# Patient Record
Sex: MALE | Race: WHITE | Employment: FULL TIME | URBAN - NONMETROPOLITAN AREA
[De-identification: names, ages, dates, MRNs, and addresses within clinical notes are randomized per-mention and may not be internally consistent; named-entity substitution may affect disease eponyms.]

---

## 2017-05-19 LAB
BUN BLDV-MCNC: 17 MG/DL
CALCIUM SERPL-MCNC: 9.4 MG/DL
CHLORIDE BLD-SCNC: 102 MMOL/L
CO2: 28 MMOL/L
CREAT SERPL-MCNC: 0.91 MG/DL
GFR CALCULATED: >60
GLUCOSE BLD-MCNC: 89 MG/DL
POTASSIUM SERPL-SCNC: 4.8 MMOL/L
SODIUM BLD-SCNC: 138 MMOL/L

## 2017-05-31 ENCOUNTER — OFFICE VISIT (OUTPATIENT)
Dept: NEPHROLOGY | Age: 32
End: 2017-05-31

## 2017-05-31 VITALS
DIASTOLIC BLOOD PRESSURE: 82 MMHG | WEIGHT: 240.1 LBS | HEART RATE: 74 BPM | OXYGEN SATURATION: 96 % | SYSTOLIC BLOOD PRESSURE: 121 MMHG

## 2017-05-31 PROCEDURE — 99203 OFFICE O/P NEW LOW 30 MIN: CPT | Performed by: INTERNAL MEDICINE

## 2017-05-31 ASSESSMENT — ENCOUNTER SYMPTOMS
SHORTNESS OF BREATH: 0
BLOOD IN STOOL: 0
CHEST TIGHTNESS: 0
WHEEZING: 0
DIARRHEA: 0
ABDOMINAL DISTENTION: 0
RESPIRATORY NEGATIVE: 1
NAUSEA: 0
CONSTIPATION: 0
GASTROINTESTINAL NEGATIVE: 1
BACK PAIN: 0
COUGH: 0
VOMITING: 0
ABDOMINAL PAIN: 0
FACIAL SWELLING: 0

## 2018-03-25 LAB
BUN BLDV-MCNC: 11 MG/DL
CALCIUM SERPL-MCNC: 8.8 MG/DL
CHLORIDE BLD-SCNC: 102 MMOL/L
CO2: 28 MMOL/L
CREAT SERPL-MCNC: 0.83 MG/DL
GFR CALCULATED: 60
GLUCOSE BLD-MCNC: 92 MG/DL
POTASSIUM SERPL-SCNC: 4.4 MMOL/L
SODIUM BLD-SCNC: 138 MMOL/L

## 2018-03-28 ENCOUNTER — OFFICE VISIT (OUTPATIENT)
Dept: NEPHROLOGY | Age: 33
End: 2018-03-28

## 2018-03-28 ENCOUNTER — TELEPHONE (OUTPATIENT)
Dept: NEPHROLOGY | Age: 33
End: 2018-03-28

## 2018-03-28 VITALS
WEIGHT: 232.4 LBS | OXYGEN SATURATION: 96 % | SYSTOLIC BLOOD PRESSURE: 117 MMHG | HEART RATE: 77 BPM | DIASTOLIC BLOOD PRESSURE: 70 MMHG

## 2018-03-28 DIAGNOSIS — I82.90 VENOUS THROMBOSIS OF LEG: Primary | ICD-10-CM

## 2018-03-28 PROCEDURE — 99213 OFFICE O/P EST LOW 20 MIN: CPT | Performed by: INTERNAL MEDICINE

## 2018-03-28 ASSESSMENT — ENCOUNTER SYMPTOMS
RESPIRATORY NEGATIVE: 1
GASTROINTESTINAL NEGATIVE: 1

## 2018-03-28 NOTE — PROGRESS NOTES
Visit Date: 3/28/2018      HPI:     Gerda Bray is a 28 y.o. male who presents today for:  Chief Complaint   Patient presents with    Chronic Kidney Disease     Stage II       Current Outpatient Prescriptions   Medication Sig Dispense Refill    Glucosamine-Chondroit-Vit C-Mn (GLUCOSAMINE 1500 COMPLEX PO) Take 1,500 mg by mouth 2 times daily.  levothyroxine (SYNTHROID) 75 MCG tablet Take 125 mcg by mouth Daily. No current facility-administered medications for this visit. No Known Allergies    Past Medical History:   Diagnosis Date    Solitary kidney       History reviewed. No pertinent surgical history. History reviewed. No pertinent family history. Social History   Substance Use Topics    Smoking status: Never Smoker    Smokeless tobacco: Never Used    Alcohol use Not on file        Subjective:      Review of Systems   Constitutional: Negative. HENT: Negative. Respiratory: Negative. Cardiovascular: Leg swelling: unilateral gerardo edema recent    Gastrointestinal: Negative. Genitourinary: Negative. Musculoskeletal: Negative. Skin: Negative. Neurological: Negative. Psychiatric/Behavioral: Negative. Objective:     /70 (Site: Right Arm, Position: Sitting)   Pulse 77   Wt 232 lb 6.4 oz (105.4 kg)   SpO2 96%     Physical Exam   Constitutional: He is oriented to person, place, and time. He appears well-developed and well-nourished. No distress. Neck: No JVD present. Cardiovascular: Normal rate, regular rhythm, normal heart sounds and intact distal pulses. Exam reveals no gallop and no friction rub. No murmur heard. Pulmonary/Chest: Effort normal and breath sounds normal. No respiratory distress. He has no wheezes. He has no rales. He exhibits no tenderness. Abdominal: Soft. Bowel sounds are normal. He exhibits no distension. There is no tenderness. There is no guarding. Musculoskeletal: Normal range of motion.  He exhibits no edema or tenderness. Lymphadenopathy:     He has no cervical adenopathy. Neurological: He is alert and oriented to person, place, and time. Skin: Skin is warm and dry. No rash noted. He is not diaphoretic. No pallor. Psychiatric: He has a normal mood and affect. His behavior is normal. Judgment and thought content normal.   Nursing note and vitals reviewed.     CBC: No results found for: WBC, HGB, HCT, MCV, PLT  BMP:    Lab Results   Component Value Date     03/25/2018     05/19/2017     04/06/2016    K 4.4 03/25/2018    K 4.8 05/19/2017    K 4.4 04/06/2016     03/25/2018     05/19/2017     04/06/2016    CO2 28 03/25/2018    CO2 28 05/19/2017    CO2 27 04/06/2016    BUN 11 03/25/2018    BUN 17 05/19/2017    BUN 10 04/06/2016    CREATININE 0.83 03/25/2018    CREATININE 0.91 05/19/2017    CREATININE 0.80 04/06/2016    GLUCOSE 92 03/25/2018    GLUCOSE 89 05/19/2017    GLUCOSE 84 04/06/2016      Hepatic: No results found for: AST, ALT, ALB, BILITOT, ALKPHOS  BNP: No results found for: BNP  Lipids: No results found for: CHOL, HDL  INR: No results found for: INR       URINE: No results found for: NAUR, PROTUR                          No results found for: NITRU, COLORU, PHUR, LABCAST, WBCUA, RBCUA, MUCUS, TRICHOMONAS, YEAST, BACTERIA, CLARITYU, SPECGRAV, LEUKOCYTESUR, UROBILINOGEN, BILIRUBINUR, BLOODU, GLUCOSEU, KETUA, AMORPHOUS      Microalbumen/Creatinine ratio:  No components found for: RUCREAT  Assessment:   Microscopic hematuria and unilateral kidney  nephrolithiasis  Rule out DVT          Plan:   Water drinking  Fu annually  Serial labs  Doppler usn for dvt  Discussed with patient          Rockney Harada, DO

## 2018-03-28 NOTE — PROGRESS NOTES
Pt states his parents have noticed his right leg swelling more than the left. Pt is on his feet all day. He does not think the swelling goes down at through the night. This seems to be getting worse per father.

## 2019-03-24 LAB
BILIRUBIN, URINE: NEGATIVE
BLOOD, URINE: NEGATIVE
BUN BLDV-MCNC: 14 MG/DL
CALCIUM SERPL-MCNC: 8.9 MG/DL
CHLORIDE BLD-SCNC: 103 MMOL/L
CLARITY: CLEAR
CO2: 29 MMOL/L
COLOR: YELLOW
CREAT SERPL-MCNC: 0.86 MG/DL
GFR CALCULATED: >60
GLUCOSE BLD-MCNC: 90 MG/DL
GLUCOSE URINE: NEGATIVE
KETONES, URINE: NEGATIVE
LEUKOCYTE ESTERASE, URINE: NEGATIVE
NITRITE, URINE: NEGATIVE
PH UA: 7.5 (ref 4.5–8)
POTASSIUM SERPL-SCNC: 4.7 MMOL/L
PROTEIN UA: NEGATIVE
SODIUM BLD-SCNC: 137 MMOL/L
SPECIFIC GRAVITY, URINE: 1.02
UROBILINOGEN, URINE: NORMAL

## 2019-04-10 ENCOUNTER — OFFICE VISIT (OUTPATIENT)
Dept: NEPHROLOGY | Age: 34
End: 2019-04-10
Payer: COMMERCIAL

## 2019-04-10 VITALS
HEART RATE: 84 BPM | DIASTOLIC BLOOD PRESSURE: 77 MMHG | WEIGHT: 237.1 LBS | OXYGEN SATURATION: 97 % | SYSTOLIC BLOOD PRESSURE: 125 MMHG

## 2019-04-10 DIAGNOSIS — N20.0 NEPHROLITHIASIS: ICD-10-CM

## 2019-04-10 DIAGNOSIS — R31.29 MICROSCOPIC HEMATURIA: ICD-10-CM

## 2019-04-10 PROCEDURE — 99213 OFFICE O/P EST LOW 20 MIN: CPT | Performed by: INTERNAL MEDICINE

## 2019-04-10 NOTE — PROGRESS NOTES
[facility]        Outpatient Follow-Up note         4/10/2019 12:35 PM    Patient Name:   Wilson Kelsey  YOB: 1985  Primary Care Physician:  Helen Hernandez DO     Chief Complaint / Reason for follow-up : Follow Up of SOlitary kidney and microscopic hematuria     Interval History :  Patient seen and examined by me. Feels well. No complaints. No cp or SOB. No episodes of hematuria     Past History :  Past Medical History:   Diagnosis Date    Solitary kidney      No past surgical history on file. Medications :     Outpatient Medications Marked as Taking for the 4/10/19 encounter (Office Visit) with Penelope Perera MD   Medication Sig Dispense Refill    Glucosamine-Chondroit-Vit C-Mn (GLUCOSAMINE 1500 COMPLEX PO) Take 1,500 mg by mouth 2 times daily.  levothyroxine (SYNTHROID) 75 MCG tablet Take 125 mcg by mouth Daily. Vitals     /77 (Site: Left Upper Arm, Position: Sitting, Cuff Size: Medium Adult)   Pulse 84   Wt 237 lb 1.6 oz (107.5 kg)   SpO2 97%  Wt Readings from Last 3 Encounters:   04/10/19 237 lb 1.6 oz (107.5 kg)   03/28/18 232 lb 6.4 oz (105.4 kg)   05/31/17 240 lb 1.6 oz (108.9 kg)        Physical Exam     General -- no distress  Lungs -- clear  Heart -- S1, S2 heard, JVD - no  Abdomen - soft, non-tender  Extremities -- no edema  CNS - awake and alert    Labs, Radiology and Tests    Labs -    4/19           Potassium 4.7           BUN 14           Creatinine 0.86           eGFR > 60                       UPCR            CR                          CT scan Scan -- 2010  Absent right kidney and 2 mm stone in the left kidney      Assessment    1. Renal - renal function appears to be stable at baseline  - He is probably stage II chronic kidney disease due to history of hematuria  - We will obtain urinalysis and urine protein creatinine ratio in the next visit  2.  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 Nephrolithiasis on the CT scan no episode of kidney stones in the past will closely follow at some point may need to renal ultrasound scan  4. Microscopic hematuria we will follow closely. 5. meds reviewed and D/W patient    Tests and orders placed this Encounter     Orders Placed This Encounter   Procedures    Comprehensive Metabolic Panel    Microalbumin / Creatinine Urine Ratio    Protein / creatinine ratio, urine    Urinalysis with Microscopic       Jamarcus Yoon M.D  [facility].

## 2020-02-20 LAB
ALBUMIN SERPL-MCNC: 4.4 G/DL
ALP BLD-CCNC: 100 U/L
ALT SERPL-CCNC: 11 U/L
ANION GAP SERPL CALCULATED.3IONS-SCNC: 13 MMOL/L
AST SERPL-CCNC: 10 U/L
BILIRUB SERPL-MCNC: 1 MG/DL (ref 0.1–1.4)
BUN BLDV-MCNC: 10 MG/DL
CALCIUM SERPL-MCNC: 9.5 MG/DL
CHLORIDE BLD-SCNC: 102 MMOL/L
CO2: 27 MMOL/L
CREAT SERPL-MCNC: 0.82 MG/DL
CREATININE, URINE: 115
GFR CALCULATED: >60
GLUCOSE BLD-MCNC: 93 MG/DL
MICROALBUMIN/CREAT 24H UR: 33.8 MG/G{CREAT}
MICROALBUMIN/CREAT UR-RTO: 29
POTASSIUM SERPL-SCNC: 4.7 MMOL/L
SODIUM BLD-SCNC: 138 MMOL/L
TOTAL PROTEIN: 8.2

## 2020-06-01 ENCOUNTER — TELEPHONE (OUTPATIENT)
Dept: NEPHROLOGY | Age: 35
End: 2020-06-01

## 2020-11-27 LAB
BILIRUBIN, URINE: NEGATIVE
BLOOD, URINE: NEGATIVE
BUN BLDV-MCNC: 13 MG/DL
CALCIUM SERPL-MCNC: 9.1 MG/DL
CHLORIDE BLD-SCNC: 103 MMOL/L
CLARITY: CLEAR
CO2: 27 MMOL/L
COLOR: YELLOW
CREAT SERPL-MCNC: 0.85 MG/DL
CREATININE, URINE: 72
GFR CALCULATED: 60
GLUCOSE BLD-MCNC: 84 MG/DL
GLUCOSE URINE: NEGATIVE
KETONES, URINE: NEGATIVE
LEUKOCYTE ESTERASE, URINE: NEGATIVE
MICROALBUMIN/CREAT 24H UR: 8.2 MG/G{CREAT}
MICROALBUMIN/CREAT UR-RTO: 11
NITRITE, URINE: NEGATIVE
PH UA: 7 (ref 4.5–8)
POTASSIUM SERPL-SCNC: 4.5 MMOL/L
PROTEIN UA: NEGATIVE
SODIUM BLD-SCNC: 137 MMOL/L
SPECIFIC GRAVITY, URINE: 1.02
UROBILINOGEN, URINE: NORMAL

## 2020-12-09 ENCOUNTER — OFFICE VISIT (OUTPATIENT)
Dept: NEPHROLOGY | Age: 35
End: 2020-12-09
Payer: COMMERCIAL

## 2020-12-09 VITALS
SYSTOLIC BLOOD PRESSURE: 129 MMHG | WEIGHT: 241 LBS | OXYGEN SATURATION: 99 % | HEART RATE: 83 BPM | DIASTOLIC BLOOD PRESSURE: 84 MMHG

## 2020-12-09 PROCEDURE — 99213 OFFICE O/P EST LOW 20 MIN: CPT | Performed by: INTERNAL MEDICINE

## 2020-12-09 NOTE — PROGRESS NOTES
[facility]        Outpatient Follow-Up note         12/9/2020 1:48 PM    Patient Name:   Sarah Kilpatrick  YOB: 1985  Primary Care Physician:  Jan Riley DO     Chief Complaint / Reason for follow-up : Follow Up of SOlitary kidney and microscopic hematuria     Interval History :  Patient seen and examined by me. Feels well. No complaints. No cp or SOB. No episodes of hematuria  No hospitalizations and no new meds. Past History :  Past Medical History:   Diagnosis Date    Solitary kidney      No past surgical history on file. Medications :     Outpatient Medications Marked as Taking for the 12/9/20 encounter (Office Visit) with Charma Kawasaki, MD   Medication Sig Dispense Refill    Glucosamine-Chondroit-Vit C-Mn (GLUCOSAMINE 1500 COMPLEX PO) Take 1,500 mg by mouth daily       levothyroxine (SYNTHROID) 75 MCG tablet Take 125 mcg by mouth Daily. Vitals     /84 (Site: Right Upper Arm, Position: Sitting, Cuff Size: Medium Adult)   Pulse 83   Wt 241 lb (109.3 kg)   SpO2 99%  Wt Readings from Last 3 Encounters:   12/09/20 241 lb (109.3 kg)   04/10/19 237 lb 1.6 oz (107.5 kg)   03/28/18 232 lb 6.4 oz (105.4 kg)        Physical Exam     General -- no distress  Lungs -- clear  Heart -- S1, S2 heard, JVD - no  Abdomen - soft, non-tender  Extremities -- no edema  CNS - awake and alert    Labs, Radiology and Tests    Labs -    4/19 12/20          Potassium 4.7 4.5          BUN 14 13          Creatinine 0.86 0.85          eGFR > 60 60                      UPCR  100          UMCR  11                        CT scan Scan -- 2010  Absent right kidney and 2 mm stone in the left kidney      Assessment    1. Renal - renal function appears to be stable at baseline  - He is probably stage II chronic kidney disease due to history of hematuria  - We will obtain urinalysis and urine protein creatinine ratio in the next visit  2.  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 Nephrolithiasis on the CT scan no episode of kidney stones in the past will closely follow at some point may need to renal ultrasound scan  4. Microscopic hematuria - follow  5. meds reviewed and D/W patient    Tests and orders placed this Encounter     Orders Placed This Encounter   Procedures    Basic Metabolic Panel    Microalbumin / Creatinine Urine Ratio    Protein / creatinine ratio, urine    Urinalysis with Microscopic       Patrecia YAKELIN Burks  [facility].

## 2021-12-08 ENCOUNTER — OFFICE VISIT (OUTPATIENT)
Dept: NEPHROLOGY | Age: 36
End: 2021-12-08
Payer: COMMERCIAL

## 2021-12-08 VITALS
HEART RATE: 82 BPM | OXYGEN SATURATION: 100 % | WEIGHT: 245 LBS | SYSTOLIC BLOOD PRESSURE: 135 MMHG | DIASTOLIC BLOOD PRESSURE: 89 MMHG | TEMPERATURE: 97.2 F

## 2021-12-08 DIAGNOSIS — R31.29 MICROSCOPIC HEMATURIA: Primary | ICD-10-CM

## 2021-12-08 DIAGNOSIS — R80.9 MICROALBUMINURIA: ICD-10-CM

## 2021-12-08 DIAGNOSIS — N18.2 CKD (CHRONIC KIDNEY DISEASE) STAGE 2, GFR 60-89 ML/MIN: ICD-10-CM

## 2021-12-08 PROCEDURE — 99213 OFFICE O/P EST LOW 20 MIN: CPT | Performed by: INTERNAL MEDICINE

## 2021-12-08 NOTE — PROGRESS NOTES
\[facility]\""[facility]        Outpatient Follow-Up note         12/8/2021 1:27 PM    Patient Name:   David Colón  YOB: 1985  Primary Care Physician:  Essie Brown DO     Chief Complaint / Reason for follow-up : Follow Up of SOlitary kidney and microscopic hematuria     Interval History :  Patient seen and examined by me. Feels well. No complaints. No cp or SOB. No episodes of hematuria  No hospitalizations and no new meds. Past History :  Past Medical History:   Diagnosis Date    Solitary kidney      No past surgical history on file. Medications :     Outpatient Medications Marked as Taking for the 12/8/21 encounter (Office Visit) with Sonia Diane MD   Medication Sig Dispense Refill    Glucosamine-Chondroit-Vit C-Mn (GLUCOSAMINE 1500 COMPLEX PO) Take 1,500 mg by mouth daily       levothyroxine (SYNTHROID) 125 MCG tablet Take 125 mcg by mouth Daily          Vitals     /89 (Site: Right Upper Arm, Position: Sitting, Cuff Size: Medium Adult)   Pulse 82   Temp 97.2 °F (36.2 °C)   Wt 245 lb (111.1 kg)   SpO2 100%  Wt Readings from Last 3 Encounters:   12/08/21 245 lb (111.1 kg)   12/09/20 241 lb (109.3 kg)   04/10/19 237 lb 1.6 oz (107.5 kg)        Physical Exam     General -- no distress  Lungs -- clear  Heart -- S1, S2 heard, JVD - no  Abdomen - soft, non-tender  Extremities -- no edema  CNS - awake and alert    Labs, Radiology and Tests    Labs -    4/19 12/20 12/21         Potassium 4.7 4.5 4.1         BUN 14 13 15         Creatinine 0.86 0.85 0.80         eGFR > 60 60 >60                     UPCR  100 100         UMCR  11 52                       CT scan Scan -- 2010  Absent right kidney and 2 mm stone in the left kidney      Assessment    1.  Renal - renal function appears to be stable at baseline  - He is probably stage II chronic kidney disease due to history of hematuria  -Has mild microalbuminuria no blood in the urine.  -For now we will closely follow  2. 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 Nephrolithiasis on the CT scan no episode of kidney stones in the past will closely follow at some point may need to renal ultrasound scan  4. Microscopic hematuria -no evidence on the latest urinalysis  5. meds reviewed and D/W patient  6. Follow-up in a year    Tests and orders placed this Encounter     No orders of the defined types were placed in this encounter. Lazara Hale M.D  [facility].

## 2022-12-14 ENCOUNTER — OFFICE VISIT (OUTPATIENT)
Dept: NEPHROLOGY | Age: 37
End: 2022-12-14
Payer: COMMERCIAL

## 2022-12-14 VITALS
WEIGHT: 239 LBS | DIASTOLIC BLOOD PRESSURE: 97 MMHG | HEART RATE: 64 BPM | OXYGEN SATURATION: 95 % | SYSTOLIC BLOOD PRESSURE: 147 MMHG

## 2022-12-14 DIAGNOSIS — N18.2 CKD (CHRONIC KIDNEY DISEASE) STAGE 2, GFR 60-89 ML/MIN: ICD-10-CM

## 2022-12-14 DIAGNOSIS — R31.29 MICROSCOPIC HEMATURIA: Primary | ICD-10-CM

## 2022-12-14 DIAGNOSIS — R80.9 MICROALBUMINURIA: ICD-10-CM

## 2022-12-14 PROCEDURE — 99213 OFFICE O/P EST LOW 20 MIN: CPT | Performed by: INTERNAL MEDICINE

## 2022-12-14 RX ORDER — LORATADINE 10 MG/1
1 CAPSULE, LIQUID FILLED ORAL DAILY
COMMUNITY

## 2022-12-14 NOTE — PROGRESS NOTES
[facility]        Outpatient Follow-Up note         12/14/2022 12:41 PM    Patient Name:   Debi Bowman  YOB: 1985  Primary Care Physician:  Raheem Rose DO     Chief Complaint / Reason for follow-up : Follow Up of SOlitary kidney and microscopic hematuria     Interval History :  Patient seen and examined by me. Feels well. No complaints. No cp or SOB. No episodes of hematuria  No hospitalizations and no new meds. Past History :  Past Medical History:   Diagnosis Date    Solitary kidney      No past surgical history on file.      Medications :     No outpatient medications have been marked as taking for the 12/14/22 encounter (Office Visit) with Indra Amado MD.       Vasile Brady     BP (!) 147/97 (Site: Left Upper Arm, Position: Sitting, Cuff Size: Medium Adult)   Pulse 64   Wt 239 lb (108.4 kg)   SpO2 95%  Wt Readings from Last 3 Encounters:   12/14/22 239 lb (108.4 kg)   12/08/21 245 lb (111.1 kg)   12/09/20 241 lb (109.3 kg)        Physical Exam     General -- no distress  Lungs -- clear  Heart -- S1, S2 heard, JVD - no  Abdomen - soft, non-tender  Extremities -- no edema  CNS - awake and alert    Labs, Radiology and Tests    Labs -    4/19 12/20 12/21 11/22        Potassium 4.7 4.5 4.1 4.6        BUN 14 13 15 16        Creatinine 0.86 0.85 0.80 0.80        eGFR > 60 60 >60 >60                    UPCR  100 100 <100        UMCR  11 52 22                      CT scan Scan -- 2010  Absent right kidney and 2 mm stone in the left kidney      Assessment    Renal - renal function appears to be stable at baseline  - He is probably stage II chronic kidney disease due to history of hematuria  -Has no significant microalbuminuria no blood in the urine.  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 Microscopic hematuria -no evidence on the latest urinalysis  meds reviewed and D/W patient  Follow-up in a year    Tests and orders placed this Encounter     No orders of the defined types were placed in this encounter. Bj Hoang M.D  [facility].

## 2024-01-10 ENCOUNTER — OFFICE VISIT (OUTPATIENT)
Dept: NEPHROLOGY | Age: 39
End: 2024-01-10
Payer: COMMERCIAL

## 2024-01-10 VITALS
OXYGEN SATURATION: 98 % | HEART RATE: 79 BPM | DIASTOLIC BLOOD PRESSURE: 98 MMHG | SYSTOLIC BLOOD PRESSURE: 153 MMHG | WEIGHT: 253 LBS

## 2024-01-10 DIAGNOSIS — N18.2 CKD (CHRONIC KIDNEY DISEASE), STAGE II: ICD-10-CM

## 2024-01-10 DIAGNOSIS — R31.29 MICROSCOPIC HEMATURIA: ICD-10-CM

## 2024-01-10 DIAGNOSIS — I10 ESSENTIAL HYPERTENSION: Primary | ICD-10-CM

## 2024-01-10 PROCEDURE — 3080F DIAST BP >= 90 MM HG: CPT | Performed by: INTERNAL MEDICINE

## 2024-01-10 PROCEDURE — 3077F SYST BP >= 140 MM HG: CPT | Performed by: INTERNAL MEDICINE

## 2024-01-10 PROCEDURE — 99213 OFFICE O/P EST LOW 20 MIN: CPT | Performed by: INTERNAL MEDICINE

## 2024-01-10 NOTE — PROGRESS NOTES
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  Microscopic hematuria -none as mentioned above  meds reviewed and D/W patient  Follow-up in a year    Tests and orders placed this Encounter     Orders Placed This Encounter   Procedures    Urinalysis with Microscopic    Microalbumin / Creatinine Urine Ratio    Protein / creatinine ratio, urine    Renal Function Panel         Saul Albert M.D  [facility].

## 2024-12-13 ENCOUNTER — TELEPHONE (OUTPATIENT)
Dept: NEPHROLOGY | Age: 39
End: 2024-12-13

## 2024-12-13 NOTE — TELEPHONE ENCOUNTER
Spoke with Dr. Albert on perfect serve. I left message to patient asking how he is feeling, making sure he drinks juice and monitor Glucose closely.

## 2024-12-13 NOTE — TELEPHONE ENCOUNTER
[facility] lab called in critical glucose of 50. Perfect serve message sent to Dr. Albert

## 2025-01-08 ENCOUNTER — OFFICE VISIT (OUTPATIENT)
Dept: NEPHROLOGY | Age: 40
End: 2025-01-08

## 2025-01-08 VITALS
DIASTOLIC BLOOD PRESSURE: 98 MMHG | SYSTOLIC BLOOD PRESSURE: 142 MMHG | WEIGHT: 249 LBS | HEART RATE: 77 BPM | OXYGEN SATURATION: 98 %

## 2025-01-08 DIAGNOSIS — N18.2 CKD (CHRONIC KIDNEY DISEASE), STAGE II: Primary | ICD-10-CM

## 2025-01-08 DIAGNOSIS — R31.29 MICROSCOPIC HEMATURIA: ICD-10-CM

## 2025-01-08 DIAGNOSIS — I10 ESSENTIAL HYPERTENSION: ICD-10-CM

## 2025-01-08 RX ORDER — CEFUROXIME AXETIL 500 MG/1
500 TABLET ORAL EVERY 12 HOURS
COMMUNITY
Start: 2025-01-07 | End: 2025-01-17

## 2025-01-08 NOTE — PROGRESS NOTES
[facility]        Outpatient Follow-Up note         1/8/2025 11:20 AM    Patient Name:   Leo Vanegas  YOB: 1985  Primary Care Physician:  Pasha Pinedo DO     Chief Complaint / Reason for follow-up : Follow Up of SOlitary kidney and microscopic hematuria     Interval History :  Patient seen and examined by me.   Feels well. No complaints.  No cp or SOB.   Sinus infection on abx     Past History :  Past Medical History:   Diagnosis Date    Solitary kidney      No past surgical history on file.     Medications :     Outpatient Medications Marked as Taking for the 1/8/25 encounter (Office Visit) with Saul Albert MD   Medication Sig Dispense Refill    cefUROXime (CEFTIN) 500 MG tablet Take 1 tablet by mouth in the morning and 1 tablet in the evening.      loratadine (CLARITIN) 10 MG capsule Take 1 tablet by mouth daily      Glucosamine-Chondroit-Vit C-Mn (GLUCOSAMINE 1500 COMPLEX PO) Take 1,500 mg by mouth daily       levothyroxine (SYNTHROID) 125 MCG tablet Take 1 tablet by mouth Daily         Vitals     BP (!) 142/98 (Site: Left Upper Arm, Position: Sitting, Cuff Size: Medium Adult)   Pulse 77   Wt 112.9 kg (249 lb)   SpO2 98%  Wt Readings from Last 3 Encounters:   01/08/25 112.9 kg (249 lb)   01/10/24 114.8 kg (253 lb)   12/14/22 108.4 kg (239 lb)        Physical Exam     General -- no distress  Lungs -- clear  Heart -- S1, S2 heard, JVD - no  Abdomen - soft, non-tender  Extremities -- no edema  CNS - awake and alert    Labs, Radiology and Tests    Labs -    4/19 12/20 12/21 11/22 12/23 12/24      Potassium 4.7 4.5 4.1 4.6 4.4 3.9      BUN 14 13 15 16 13 15      Creatinine 0.86 0.85 0.80 0.80 0.80 0.80      eGFR > 60 60 >60 >60 >60 >60                  UPCR  100 100 <100 100 150      UMCR  11 52 22 31 52      Blood     + +        CT scan Scan -- 2010  Absent right kidney and 2 mm stone in the left kidney      Assessment    Renal - renal function appears to